# Patient Record
Sex: FEMALE | Race: WHITE | NOT HISPANIC OR LATINO | URBAN - METROPOLITAN AREA
[De-identification: names, ages, dates, MRNs, and addresses within clinical notes are randomized per-mention and may not be internally consistent; named-entity substitution may affect disease eponyms.]

---

## 2019-03-01 ENCOUNTER — OFFICE VISIT (OUTPATIENT)
Dept: ORTHOPEDICS | Facility: CLINIC | Age: 54
End: 2019-03-01
Payer: MEDICARE

## 2019-03-01 DIAGNOSIS — M25.571 RIGHT ANKLE PAIN, UNSPECIFIED CHRONICITY: Primary | ICD-10-CM

## 2019-03-01 PROCEDURE — 99202 OFFICE O/P NEW SF 15 MIN: CPT | Mod: S$GLB,,, | Performed by: ORTHOPAEDIC SURGERY

## 2019-03-01 PROCEDURE — 99202 PR OFFICE/OUTPT VISIT, NEW, LEVL II, 15-29 MIN: ICD-10-PCS | Mod: S$GLB,,, | Performed by: ORTHOPAEDIC SURGERY

## 2019-03-01 RX ORDER — HYDROCODONE BITARTRATE AND ACETAMINOPHEN 5; 325 MG/1; MG/1
1 TABLET ORAL EVERY 6 HOURS PRN
Qty: 20 TABLET | Refills: 0
Start: 2019-03-01

## 2019-03-01 NOTE — PATIENT INSTRUCTIONS
She is placed in a 3D fracture brace.  Arrangements were made for a course of physical therapy to include contrast, ice, and knee GS.  She will be seen back in 3 weeks for recheck.

## 2019-03-01 NOTE — PROGRESS NOTES
Subjective:      Patient ID: Sharee Munguia is a 53 y.o. female.    Chief Complaint: No chief complaint on file.    This is a 53-year-old lady fell 3 days ago and injured her right ankle.  She felt a pop when she fell.  She comes in complaining of pain and swelling in the ankle.  She was seen at a local emergency room and x-ray.  She was placed in a posterior splint.  She is status post ORIF a 5th of this ankle several years ago by another surgeon        Review of Systems   Constitution: Negative for fever and weight loss.   Cardiovascular: Negative for chest pain and leg swelling.   Musculoskeletal: Positive for arthritis, joint pain, joint swelling and stiffness. Negative for muscle weakness.   Genitourinary: Negative for bladder incontinence and hematuria.   Neurological: Negative for focal weakness, numbness, paresthesias and sensory change.   Psychiatric/Behavioral: Negative.          Objective:                General Musculoskeletal Exam   Gait: antalgic     Left Ankle/Foot Exam     Inspection  Deformity: present  Bruising: Ankle - present Foot - present  Effusion: Ankle - present     Pain   The patient exhibits pain of the metatarsals, lateral malleolus and medial malleolus.    Swelling   The patient is swollen on the lateral malleolus and medial malleolus.    Range of Motion   Ankle Joint  Dorsiflexion: abnormal   Plantar flexion: abnormal     Tests   Anterior drawer: 2+    Other   Sensation: normal    Comments:  Range of motion not attempted secondary to pain and swelling                Assessment:       Encounter Diagnosis   Name Primary?    Right ankle pain, unspecified chronicity Yes          Plan:       Diagnoses and all orders for this visit:    Right ankle pain, unspecified chronicity

## 2019-03-08 ENCOUNTER — TELEPHONE (OUTPATIENT)
Dept: ORTHOPEDICS | Facility: CLINIC | Age: 54
End: 2019-03-08

## 2019-03-08 NOTE — TELEPHONE ENCOUNTER
----- Message from Keesha García sent at 3/8/2019 12:03 PM CST -----  Contact: PT CALLED  PT IS ASKING  IF THERE IS A PAIN CREAM SHE COULD GET TO USE ON FOOT AND ANKLE?    PT # [phone]

## 2019-03-08 NOTE — TELEPHONE ENCOUNTER
3/8/19  3:50pm   Spoke w/ pt.  May use OTC creams as long as its not something that if hot (like icy hot). She states that she has lidocaine cream at home & an aloe vera cream.   Inst. It is ok to use.  Cont. W/ice & elevation.

## 2019-03-19 ENCOUNTER — TELEPHONE (OUTPATIENT)
Dept: ORTHOPEDICS | Facility: CLINIC | Age: 54
End: 2019-03-19

## 2019-03-19 NOTE — TELEPHONE ENCOUNTER
----- Message from Zhane Gonzalez sent at 3/19/2019  2:04 PM CDT -----  Having a lot of pain want to know if she can were compression sock

## 2019-03-19 NOTE — TELEPHONE ENCOUNTER
3/19/19  3:50pm   Spoke w/ pt.  Compression sock is ok as long as it is not too tight.  I encouraged her to stay with an ankle sleeve instead.

## 2019-03-21 RX ORDER — TRIAMCINOLONE ACETONIDE 1 MG/G
CREAM TOPICAL
COMMUNITY
Start: 2018-12-31

## 2019-03-21 RX ORDER — AMOXICILLIN AND CLAVULANATE POTASSIUM 875; 125 MG/1; MG/1
TABLET, FILM COATED ORAL
COMMUNITY

## 2019-03-21 RX ORDER — ALBUTEROL SULFATE 90 UG/1
AEROSOL, METERED RESPIRATORY (INHALATION)
COMMUNITY

## 2019-03-21 RX ORDER — ATORVASTATIN CALCIUM 20 MG/1
TABLET, FILM COATED ORAL
COMMUNITY
Start: 2019-03-04

## 2019-03-21 RX ORDER — TRIAMCINOLONE ACETONIDE 1 MG/G
CREAM TOPICAL
COMMUNITY

## 2019-03-21 RX ORDER — FLUTICASONE PROPIONATE 50 MCG
SPRAY, SUSPENSION (ML) NASAL
COMMUNITY

## 2019-03-21 RX ORDER — OXYCODONE AND ACETAMINOPHEN 7.5; 325 MG/1; MG/1
TABLET ORAL
Refills: 0 | COMMUNITY
Start: 2019-02-26

## 2019-03-21 RX ORDER — PANTOPRAZOLE SODIUM 40 MG/1
TABLET, DELAYED RELEASE ORAL
COMMUNITY

## 2019-03-21 RX ORDER — FLUOXETINE HYDROCHLORIDE 20 MG/1
CAPSULE ORAL
COMMUNITY
Start: 2019-02-09

## 2019-03-21 RX ORDER — ASPIRIN 325 MG
TABLET, DELAYED RELEASE (ENTERIC COATED) ORAL
COMMUNITY

## 2019-03-21 RX ORDER — FLUOXETINE 10 MG/1
CAPSULE ORAL
Refills: 3 | COMMUNITY
Start: 2019-01-31

## 2019-03-21 RX ORDER — IBUPROFEN 800 MG/1
TABLET ORAL
Refills: 3 | COMMUNITY
Start: 2019-01-31

## 2019-03-21 RX ORDER — ESOMEPRAZOLE MAGNESIUM 40 MG/1
CAPSULE, DELAYED RELEASE ORAL
COMMUNITY
Start: 2019-03-06

## 2019-03-21 RX ORDER — PROMETHAZINE HYDROCHLORIDE 25 MG/1
TABLET ORAL
COMMUNITY

## 2019-03-21 RX ORDER — IBUPROFEN 600 MG/1
TABLET ORAL
Refills: 0 | COMMUNITY
Start: 2019-02-26

## 2019-03-21 RX ORDER — MELOXICAM 15 MG/1
TABLET ORAL
COMMUNITY

## 2019-03-21 RX ORDER — BETAMETHASONE SODIUM PHOSPHATE AND BETAMETHASONE ACETATE 3; 3 MG/ML; MG/ML
1.5 INJECTION, SUSPENSION INTRA-ARTICULAR; INTRALESIONAL; INTRAMUSCULAR; SOFT TISSUE
COMMUNITY

## 2019-03-21 RX ORDER — TERCONAZOLE 4 MG/G
CREAM VAGINAL
COMMUNITY

## 2019-03-21 RX ORDER — TEMAZEPAM 30 MG/1
CAPSULE ORAL
COMMUNITY

## 2019-03-21 RX ORDER — AMOXICILLIN 875 MG/1
TABLET, FILM COATED ORAL
COMMUNITY

## 2019-03-21 RX ORDER — FLUCONAZOLE 150 MG/1
TABLET ORAL
COMMUNITY

## 2019-03-22 ENCOUNTER — OFFICE VISIT (OUTPATIENT)
Dept: ORTHOPEDICS | Facility: CLINIC | Age: 54
End: 2019-03-22
Payer: MEDICARE

## 2019-03-22 DIAGNOSIS — S93.401D MODERATE RIGHT ANKLE SPRAIN, SUBSEQUENT ENCOUNTER: Primary | ICD-10-CM

## 2019-03-22 PROCEDURE — 99213 OFFICE O/P EST LOW 20 MIN: CPT | Mod: S$GLB,,, | Performed by: ORTHOPAEDIC SURGERY

## 2019-03-22 PROCEDURE — 99213 PR OFFICE/OUTPT VISIT, EST, LEVL III, 20-29 MIN: ICD-10-PCS | Mod: S$GLB,,, | Performed by: ORTHOPAEDIC SURGERY

## 2019-03-22 RX ORDER — TRAMADOL HYDROCHLORIDE 50 MG/1
50 TABLET ORAL EVERY 6 HOURS
Qty: 20 TABLET | Refills: 0
Start: 2019-03-22

## 2019-03-22 NOTE — PROGRESS NOTES
Subjective:      Patient ID: Sharee Munguia is a 53 y.o. female.    Chief Complaint: Ankle Pain (RT)    HPI patient is here for follow-up for her right ankle sprain.  She states that she has significantly improved.  Her swelling is also decreasing    Review of Systems   Constitution: Negative for fever and weight loss.   Cardiovascular: Negative for chest pain and leg swelling.   Musculoskeletal: Positive for joint pain and joint swelling. Negative for arthritis, muscle weakness and stiffness.   Gastrointestinal: Negative for change in bowel habit.   Genitourinary: Negative for bladder incontinence and hematuria.   Neurological: Positive for numbness, paresthesias and sensory change. Negative for focal weakness.         Objective:                    Left Ankle/Foot Exam     Pain   The patient exhibits pain of the anterior talofibular ligament and lateral malleolus.    Range of Motion   Ankle Joint  Dorsiflexion: normal   Plantar flexion: normal     Subtalar Joint   Inversion: normal   Eversion: normal     Tests   Anterior drawer: negative  Varus tilt: negative                Assessment:       Encounter Diagnosis   Name Primary?    Moderate right ankle sprain, subsequent encounter Yes          Plan:       Sharee was seen today for ankle pain.    Diagnoses and all orders for this visit:    Moderate right ankle sprain, subsequent encounter      Patient can discontinue the use of her fracture brace